# Patient Record
Sex: FEMALE | Race: WHITE | NOT HISPANIC OR LATINO | URBAN - METROPOLITAN AREA
[De-identification: names, ages, dates, MRNs, and addresses within clinical notes are randomized per-mention and may not be internally consistent; named-entity substitution may affect disease eponyms.]

---
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
## 2023-06-26 NOTE — ED PROVIDER NOTE - MDM ORDERS SUBMITTED SELECTION
I think this was intended for RP for a 2nd opinion, below it sounded like dissatisfied with clinic visit with me.    As such, can set her up for 2nd opinion perhaps with NP if sooner and then RP    My clinic visit plan was to increase her Miralax to more frequent dosing, mom had a hard time getting her to take it which may be a problem    I'm ok with her getting an abdominal xray to assess stool burden please order.    If they are concerned about abdominal pain and bloating, might have to go to [facility] or ER for an exam as well for a more urgent eval    Can clarify who they wanted to follow up with, it sounds like they want a different GI doctor, in which case cancel December with me and set up with NP or new doctor    kj         Labs Labs/Imaging Studies

## 2023-07-28 ENCOUNTER — EMERGENCY (EMERGENCY)
Facility: HOSPITAL | Age: 18
LOS: 1 days | Discharge: HOME | End: 2023-07-28
Attending: EMERGENCY MEDICINE | Admitting: EMERGENCY MEDICINE
Payer: COMMERCIAL

## 2023-07-28 VITALS
DIASTOLIC BLOOD PRESSURE: 72 MMHG | TEMPERATURE: 99 F | RESPIRATION RATE: 16 BRPM | HEIGHT: 68 IN | WEIGHT: 130.07 LBS | HEART RATE: 58 BPM | SYSTOLIC BLOOD PRESSURE: 124 MMHG | OXYGEN SATURATION: 98 %

## 2023-07-28 PROCEDURE — 99284 EMERGENCY DEPT VISIT MOD MDM: CPT

## 2023-07-28 PROCEDURE — 76856 US EXAM PELVIC COMPLETE: CPT | Mod: 26

## 2023-07-28 NOTE — ED PROVIDER NOTE - CLINICAL SUMMARY MEDICAL DECISION MAKING FREE TEXT BOX
19 yo F presented to ED for intermittent abdominal pain. Abdomen SNTND making acute appendicitis unlikely. UA negative at urgent care. will do pelvic US to ru ovarian cysts

## 2023-07-28 NOTE — ED PROVIDER NOTE - PROGRESS NOTE DETAILS
Patient transvaginal portion US. Abdominal US demonstrated that it was normal with physiologic free fluid in the cul-de-sac but no signs of torsion or large cysts.  This is a preliminary read discussed results with patient and mother felt comfortable leaving and following up with final read in the morning.  Strict return precautions given and both mom and patient understands DC home.

## 2023-07-28 NOTE — ED ADULT TRIAGE NOTE - CHIEF COMPLAINT QUOTE
Pt. walk in from  for b/l lower abd pain with tenderness to RLQ and back pain with associated chills and nausea since yesterday. Took 3 advil @ 7pm.

## 2023-07-28 NOTE — ED PROVIDER NOTE - PATIENT PORTAL LINK FT
You can access the FollowMyHealth Patient Portal offered by [facility] by registering at the following website: [email or web address]. By joining [employer]’s FollowMyHealth portal, you will also be able to view your health information using other applications (apps) compatible with our system.

## 2023-07-28 NOTE — ED PROVIDER NOTE - OBJECTIVE STATEMENT
19 yo female with no past medical history emergency department for intermittent abdominal pain. patient went to urgent care and had a pregnancy test which was negative and sent her to the emergency department for further evaluation management.  Patient has had subjective chills but no fever.  No nausea no vomiting no diarrhea no constipation.  No abdominal surgeries.  No urinary symptoms.  No vaginal discharge.

## 2023-07-28 NOTE — ED PROVIDER NOTE - PHYSICAL EXAMINATION
Const: NAD  Eyes: PERRL, no conjunctival injection  HENT:  Neck supple without meningismus   CV: RRR, Warm, well-perfused extremities  RESP: CTA B/L, no tachypnea   GI: soft, non-tender, non-distended, no tenderness over McBurney's point, negative rovsing sign  MSK: No gross deformities appreciated  Skin: Warm, dry. No rashes  Neuro: Alert, CNs II-XII grossly intact. Sensation and motor function of extremities grossly intact.  Psych: Appropriate mood and affect.

## 2023-07-28 NOTE — ED ADULT TRIAGE NOTE - AS PAIN REST
[place] Pregnancy And Lactation Text: The risk during pregnancy and breastfeeding is uncertain with this medication. 4 (moderate pain)

## 2023-07-28 NOTE — ED ADULT NURSE NOTE - NSFALLUNIVINTERV_ED_ALL_ED
Bed/Stretcher in lowest position, wheels locked, appropriate side rails in place/Call bell, personal items and telephone in reach/Instruct patient to call for assistance before getting out of bed/chair/stretcher/Non-slip footwear applied when patient is off stretcher/[place] to call system/Physically safe environment - no spills, clutter or unnecessary equipment/Purposeful proactive rounding/Room/bathroom lighting operational, light cord in reach

## 2023-07-31 DIAGNOSIS — R10.9 UNSPECIFIED ABDOMINAL PAIN: ICD-10-CM

## 2023-07-31 DIAGNOSIS — R68.83 CHILLS (WITHOUT FEVER): ICD-10-CM

## 2024-11-06 NOTE — ED ADULT NURSE NOTE - ISOLATION TYPE:
Detail Level: Zone Detail Level: Detailed Size Of Lesion In Cm (Optional): 0.4 X Size Of Lesion In Cm (Optional): 0 Detail Level: Generalized Sunscreen Recommendations: continue spf 30 + daily, hats, long sleeves Nicotinamide Supplementation Recommendations: Continue 500 mg nicotinamide BID Detail Level: Simple None

## 2025-07-02 NOTE — ED ADULT TRIAGE NOTE - IDEAL BODY WEIGHT(KG)
[facility] to authorize order with patient insurance. Patient is scheduled for CT Sinus with radiology on 8/18/25 @ 9:45am. Patient has been notified of date and time and that they need to arrive at 9:15am. Patient was informed he has no prep prior to procedure. Patient instructed to park in [facility] parking lot and report to registration. Patient verbalized understanding.    Electronically signed by Catia Molina MA on 7/2/25 at 11:43 AM EDT       62